# Patient Record
Sex: MALE | Race: WHITE | NOT HISPANIC OR LATINO
[De-identification: names, ages, dates, MRNs, and addresses within clinical notes are randomized per-mention and may not be internally consistent; named-entity substitution may affect disease eponyms.]

---

## 2021-10-25 PROBLEM — Z00.00 ENCOUNTER FOR PREVENTIVE HEALTH EXAMINATION: Status: ACTIVE | Noted: 2021-10-25

## 2021-10-26 ENCOUNTER — APPOINTMENT (OUTPATIENT)
Dept: COLORECTAL SURGERY | Facility: CLINIC | Age: 41
End: 2021-10-26

## 2022-01-07 ENCOUNTER — NON-APPOINTMENT (OUTPATIENT)
Age: 42
End: 2022-01-07

## 2022-01-07 ENCOUNTER — APPOINTMENT (OUTPATIENT)
Dept: COLORECTAL SURGERY | Facility: CLINIC | Age: 42
End: 2022-01-07
Payer: COMMERCIAL

## 2022-01-18 ENCOUNTER — NON-APPOINTMENT (OUTPATIENT)
Age: 42
End: 2022-01-18

## 2022-02-03 NOTE — HISTORY OF PRESENT ILLNESS
[FreeTextEntry1] : 40 y/o M presents for evaluation of hemorrhoids\par PMH of kidney stones\par \par \par BH:\par constipation, diarrhea or straining\par stool softeners, fiber supplements or laxatives\par dietary fiber intake \par \par \par colonoscopy\par Denies FMH of IBD or colorectal cancer. FMH of breast cancer in mother \par ASA/NSAIDs last 7 days

## 2022-02-04 ENCOUNTER — APPOINTMENT (OUTPATIENT)
Dept: COLORECTAL SURGERY | Facility: CLINIC | Age: 42
End: 2022-02-04
Payer: COMMERCIAL

## 2022-02-04 VITALS
BODY MASS INDEX: 32.29 KG/M2 | SYSTOLIC BLOOD PRESSURE: 124 MMHG | HEIGHT: 69 IN | TEMPERATURE: 97.8 F | WEIGHT: 218 LBS | DIASTOLIC BLOOD PRESSURE: 85 MMHG | HEART RATE: 56 BPM

## 2022-02-04 DIAGNOSIS — K64.8 OTHER HEMORRHOIDS: ICD-10-CM

## 2022-02-04 PROCEDURE — 99202 OFFICE O/P NEW SF 15 MIN: CPT | Mod: 25

## 2022-02-04 PROCEDURE — 46600 DIAGNOSTIC ANOSCOPY SPX: CPT

## 2022-02-04 NOTE — HISTORY OF PRESENT ILLNESS
[FreeTextEntry1] : 42 y/o M presents for evaluation of hemorrhoids\par PMH of kidney stones\par Presents for evaluation of 13-year history of hemorrhoidal protrusion.  Reports occasional rectal bleeding.  2 episodes of severe hemorrhoidal swelling/attacks that were self-limited and resolve on their own.\par \par Denies constipation or diarrhea.\par \par Vegetarian.\par \par \par Denies FMH of IBD or colorectal cancer. FMH Mother: breast cancer and HTN. Father: DM, HTN. \par ASA/NSAIDs last 7 days \par

## 2022-02-04 NOTE — PHYSICAL EXAM
[Excoriation] : no perianal excoriation [Skin Tags] : there were no residual hemorrhoidal skin tags seen [Normal] : was normal [None] : there was no rectal mass  [FreeTextEntry1] : Medical assistant was present for the entire exam.\par \par Anoscopy was performed for evaluation of the patients rectal bleeding  history .\par The risks, benefits and alternatives were reviewed.\par \par A lighted anoscope was passed into the anal canal and the entire anal mucosal surface was inspected..  \par The findings revealed moderate/large right posterior quadrant and left lateral internal hemorrhoids.\par No masses or lesions were identified.\par \par

## 2022-02-04 NOTE — ASSESSMENT
[FreeTextEntry1] : I reviewed with the patient the treatment options for internal hemorrhoids. At this time I have suggested the patient consider increasing daily fiber intake (fiber guidelines both synthetic and dietary were reviewed), increasing water, and stool softeners as necessary. The role of medical and surgical therapy has been outlined. The risks, benefits and alternatives including post procedure pain, bleeding, infection, and the need for future procedures have been discussed.\par Patient is traveling skiing today.\par \par Advised return if symptoms are persistent for rubber band ligation.  Risk, benefits and alternatives outlined.  All questions answered

## 2025-04-23 ENCOUNTER — NON-APPOINTMENT (OUTPATIENT)
Age: 45
End: 2025-04-23

## 2025-05-23 ENCOUNTER — APPOINTMENT (OUTPATIENT)
Dept: INTERNAL MEDICINE | Facility: CLINIC | Age: 45
End: 2025-05-23